# Patient Record
Sex: MALE | Race: WHITE
[De-identification: names, ages, dates, MRNs, and addresses within clinical notes are randomized per-mention and may not be internally consistent; named-entity substitution may affect disease eponyms.]

---

## 2020-08-11 ENCOUNTER — HOSPITAL ENCOUNTER (EMERGENCY)
Dept: HOSPITAL 41 - JD.ED | Age: 25
Discharge: HOME | End: 2020-08-11
Payer: COMMERCIAL

## 2020-08-11 VITALS — SYSTOLIC BLOOD PRESSURE: 124 MMHG | DIASTOLIC BLOOD PRESSURE: 70 MMHG | HEART RATE: 73 BPM

## 2020-08-11 DIAGNOSIS — R07.9: Primary | ICD-10-CM

## 2020-08-11 NOTE — EDM.PDOC
ED HPI GENERAL MEDICAL PROBLEM





- General


Chief Complaint: Chest Pain


Stated Complaint: CHEST PAIN


Time Seen by Provider: 08/11/20 16:04


Source of Information: Reports: Patient, RN Notes Reviewed


History Limitations: Reports: No Limitations





- History of Present Illness


INITIAL COMMENTS - FREE TEXT/NARRATIVE: 





Patient is a 24-year-old male who presents to the ED for his chest pain.  He 

notes that this did start last night, he states that the pain started in his 

left chest, and seem to shoot straight through to his back, and he appreciated 

it radiated to his jaw as well.  He notes that this was a sharp stabbing pain 

like someone was stabbing him with a knife.  He thinks maybe it could have 

radiated to his right chest, but states it stayed mostly in his left side.  He 

does not complain of any provoking or alleviating factors.  He noted that it 

lasted for about 8 minutes last night.  He takes fluoxetine 20 mg daily, is seen

by Dr. Redd as his primary care provider.  He denies any past medical 

history otherwise, and he does not believe that his family has any sort of early

cardiac demise that he is ever been told.  He denies any fever/chills, 

cough/shortness of breath, nausea/vomiting/diarrhea.  He reports that he was in 

good health prior to the chest pain.  He did not do any heavy lifting, or other 

odd movements prior to the chest pain.


  ** Chest


Pain Score (Numeric/FACES): 1





- Related Data


                                    Allergies











Allergy/AdvReac Type Severity Reaction Status Date / Time


 


No Known Allergies Allergy   Verified 08/11/20 16:06











Home Meds: 


                                    Home Meds





. [No Known Home Meds]  08/02/16 [History]











Past Medical History





- Past Health History


Medical/Surgical History: Denies Medical/Surgical History


Psychiatric History: Reports: Depression





- Past Surgical History


GI Surgical History: Reports: Appendectomy





Social & Family History





- Family History


Family Medical History: Noncontributory





- Tobacco Use


Smoking Status *Q: Never Smoker





- Caffeine Use


Caffeine Use: Reports: Coffee, Soda





- Recreational Drug Use


Recreational Drug Use: No





- Living Situation & Occupation


Living situation: Reports: Single, Alone


Occupation: Employed





ED ROS GENERAL





- Review of Systems


Review Of Systems: Comprehensive ROS is negative, except as noted in HPI.





ED EXAM, GENERAL





- Physical Exam


Exam: See Below


Exam Limited By: No Limitations


General Appearance: Alert, WD/WN, No Apparent Distress


Respiratory/Chest: No Respiratory Distress, Lungs Clear, Normal Breath Sounds, 

No Accessory Muscle Use, Other (tender to left chest around the 4th-5th rib mid 

clavicular line.)


Cardiovascular: Normal Peripheral Pulses, Regular Rate, Rhythm, No Edema, No 

Murmur


Peripheral Pulses: 2+: Radial (L), Radial (R)


Extremities: Normal Inspection, Normal Capillary Refill


Neurological: Alert, Oriented, Normal Cognition, No Motor/Sensory Deficits


Psychiatric: Normal Affect, Normal Mood


Skin Exam: Warm, Dry, Intact, Normal Color, No Rash





EKG INTERPRETATION


EKG Date: 08/11/20


Time: 16:05


Rhythm: NSR


Rate (Beats/Min): 81


Axis: Normal


P-Wave: Present


QRS: Normal


ST-T: Normal


QT: Normal


Comparison: NA - No Prior EKG


EKG Interpretation Comments: 





No obvious ischemia or acute ST changes noted, reviewed by myself and Dr. Clements.





Course





- Vital Signs


Last Recorded V/S: 


                                Last Vital Signs











Temp  97.6 F   08/11/20 16:03


 


Pulse  73   08/11/20 16:03


 


Resp  16   08/11/20 16:03


 


BP  124/70   08/11/20 16:03


 


Pulse Ox  95   08/11/20 16:03














- Orders/Labs/Meds


Orders: 


                               Active Orders 24 hr











 Category Date Time Status


 


 EKG Documentation Completion [RC] STAT Care  08/11/20 16:07 Ordered


 


 Chest 1V Frontal [CR] Stat Exams  08/11/20 16:07 Ordered











Labs: 


                                Laboratory Tests











  08/11/20 08/11/20 Range/Units





  16:28 16:28 


 


WBC  4.89   (4.23-9.07)  K/mm3


 


RBC  5.23   (4.63-6.08)  M/mm3


 


Hgb  15.6  D   (13.7-17.5)  gm/dl


 


Hct  43.4   (40.1-51.0)  %


 


MCV  83.0   (79.0-92.2)  fl


 


MCH  29.8   (25.7-32.2)  pg


 


MCHC  35.9 H   (32.2-35.5)  g/dl


 


RDW Std Deviation  37.9   (35.1-43.9)  fL


 


Plt Count  213   (163-337)  K/mm3


 


MPV  9.6   (9.4-12.3)  fl


 


Neut % (Auto)  60.3   (34.0-67.9)  %


 


Lymph % (Auto)  25.4   (21.8-53.1)  %


 


Mono % (Auto)  10.8   (5.3-12.2)  %


 


Eos % (Auto)  2.5   (0.8-7.0)  


 


Baso % (Auto)  1.0   (0.1-1.2)  %


 


Neut # (Auto)  2.95   (1.78-5.38)  K/mm3


 


Lymph # (Auto)  1.24 L   (1.32-3.57)  K/mm3


 


Mono # (Auto)  0.53   (0.30-0.82)  K/mm3


 


Eos # (Auto)  0.12   (0.04-0.54)  K/mm3


 


Baso # (Auto)  0.05   (0.01-0.08)  K/mm3


 


Sodium   139  (136-145)  mEq/L


 


Potassium   3.8  (3.5-5.1)  mEq/L


 


Chloride   105  ()  mEq/L


 


Carbon Dioxide   26  (21-32)  mEq/L


 


Anion Gap   11.8  (5-15)  


 


BUN   14  (7-18)  mg/dL


 


Creatinine   1.1  (0.7-1.3)  mg/dL


 


Est Cr Clr Drug Dosing   101.77  mL/min


 


Estimated GFR (MDRD)   > 60  (>60)  mL/min


 


BUN/Creatinine Ratio   12.7 L  (14-18)  


 


Glucose   87  ()  mg/dL


 


Calcium   9.1  (8.5-10.1)  mg/dL


 


Magnesium   2.0  (1.8-2.4)  mg/dl


 


Total Bilirubin   0.4  (0.2-1.0)  mg/dL


 


AST   23  (15-37)  U/L


 


ALT   43  (16-63)  U/L


 


Alkaline Phosphatase   79  ()  U/L


 


Troponin I   < 0.017  (0.00-0.056)  ng/mL


 


Total Protein   7.2  (6.4-8.2)  g/dl


 


Albumin   3.9  (3.4-5.0)  g/dl


 


Globulin   3.3  gm/dL


 


Albumin/Globulin Ratio   1.2  (1-2)  














- Re-Assessments/Exams


Free Text/Narrative Re-Assessment/Exam: 





08/11/20 16:19


Patient presents to the ED for evaluation of his left-sided chest pain.  EKG was

 obtained at time of triage and is completely normal.  This was read by myself 

and Dr. Clements.  He will also get a chest x-ray, and basic labs at this time.  

This is most likely chest wall, or noncardiac etiology in nature.





08/11/20 17:11


X-ray, and labs are all unremarkable.  Patient will be discharged home with 

general recommendations, again this is most likely chest wall pain in nature. 





Departure





- Departure


Time of Disposition: 17:11


Disposition: Home, Self-Care 01


Condition: Good


Clinical Impression: 


 Left-sided chest pain





Instructions:  Nonspecific Chest Pain, Adult, Easy-to-Read, Chest Wall Pain, 

Easy-to-Read


Referrals: 


Taz Kumari MD [Primary Care Provider] - 


Forms:  ED Department Discharge


Additional Instructions: 


You were evaluated in the ER today for your left-sided chest pain.  





Laboratory evaluation, EKG, chest x-ray are all within normal limits at this 

time.  There is no sign at all that you are suffering from any sort of heart 

attack in nature.





Your pain is most likely musculoskeletal in origin.  You may take 500 mg Tylenol

or 600 mg ibuprofen every 6 hours as needed for further pain relief.  Do not 

exceed 4000 mg Tylenol or 600 mg ibuprofen in a 24-hour time span.





Please return to the ER at any time if your symptoms should change or worsen.











Sepsis Event Note (ED)





- Evaluation


Sepsis Screening Result: No Definite Risk





- Focused Exam


Vital Signs: 


                                   Vital Signs











  Temp Pulse Resp BP Pulse Ox


 


 08/11/20 16:03  97.6 F  73  16  124/70  95














- My Orders


Last 24 Hours: 


My Active Orders





08/11/20 16:07


EKG Documentation Completion [RC] STAT 


Chest 1V Frontal [CR] Stat 














- Assessment/Plan


Last 24 Hours: 


My Active Orders





08/11/20 16:07


EKG Documentation Completion [RC] STAT 


Chest 1V Frontal [CR] Stat

## 2020-08-12 NOTE — CR
Chest: Portable view of the chest was obtained.

 

Comparison: No prior chest imaging is available.

 

Heart size and mediastinum are normal.  Lungs are clear with no acute 

parenchymal change.  Minimal scoliosis is seen within the spine.

 

Impression:

1.  Minimal scoliosis.

2.  Nothing acute is appreciated on portable chest x-ray.

 

Diagnostic code #2

 

Study was dictated in MDT